# Patient Record
Sex: FEMALE | Race: WHITE | HISPANIC OR LATINO | Employment: FULL TIME | URBAN - METROPOLITAN AREA
[De-identification: names, ages, dates, MRNs, and addresses within clinical notes are randomized per-mention and may not be internally consistent; named-entity substitution may affect disease eponyms.]

---

## 2024-05-09 ENCOUNTER — HOSPITAL ENCOUNTER (EMERGENCY)
Facility: HOSPITAL | Age: 19
Discharge: HOME | End: 2024-05-10
Attending: EMERGENCY MEDICINE
Payer: COMMERCIAL

## 2024-05-09 ENCOUNTER — APPOINTMENT (OUTPATIENT)
Dept: GENERAL RADIOLOGY | Facility: HOSPITAL | Age: 19
End: 2024-05-09
Payer: COMMERCIAL

## 2024-05-09 DIAGNOSIS — R10.32 LEFT LOWER QUADRANT ABDOMINAL PAIN: ICD-10-CM

## 2024-05-09 DIAGNOSIS — K59.00 CONSTIPATION, UNSPECIFIED CONSTIPATION TYPE: Primary | ICD-10-CM

## 2024-05-09 LAB
B-HCG UR QL: NEGATIVE
BILIRUB UR QL STRIP: NEGATIVE
CLARITY UR: ABNORMAL
COLOR UR: ABNORMAL
GLUCOSE UR STRIP-MCNC: NEGATIVE MG/DL
HGB UR QL STRIP.AUTO: ABNORMAL
KETONES UR QL STRIP: NEGATIVE
LEUKOCYTE ESTERASE UR QL STRIP.AUTO: NEGATIVE
NITRITE UR QL STRIP: NEGATIVE
PH UR STRIP.AUTO: 6 [PH] (ref 5–8)
PROT UR QL STRIP: NEGATIVE
SP GR UR STRIP: 1.01 (ref 1–1.03)
UROBILINOGEN UR QL STRIP: ABNORMAL

## 2024-05-09 PROCEDURE — 81001 URINALYSIS AUTO W/SCOPE: CPT | Performed by: EMERGENCY MEDICINE

## 2024-05-09 PROCEDURE — 99284 EMERGENCY DEPT VISIT MOD MDM: CPT

## 2024-05-09 PROCEDURE — 81025 URINE PREGNANCY TEST: CPT | Performed by: EMERGENCY MEDICINE

## 2024-05-09 PROCEDURE — 74018 RADEX ABDOMEN 1 VIEW: CPT

## 2024-05-09 NOTE — Clinical Note
[facility] FSED BALAJI  [address]  [facility] [address]    Baron Wilkinson was seen and treated in our emergency department on 5/9/2024.  She may return to work on 05/11/2024.         Thank you for choosing [facility].    Kevin Clarke MD

## 2024-05-09 NOTE — Clinical Note
[facility]  [address]  [facility] [address]    Baron Wilkinson was seen and treated in our emergency department on 5/9/2024.  She may return to work on 05/11/2024.         Thank you for choosing [facility].    Kevin Clarke MD

## 2024-05-10 ENCOUNTER — APPOINTMENT (OUTPATIENT)
Dept: CT IMAGING | Facility: HOSPITAL | Age: 19
End: 2024-05-10
Payer: COMMERCIAL

## 2024-05-10 VITALS
TEMPERATURE: 98 F | WEIGHT: 103.4 LBS | OXYGEN SATURATION: 100 % | RESPIRATION RATE: 18 BRPM | HEIGHT: 65 IN | SYSTOLIC BLOOD PRESSURE: 118 MMHG | HEART RATE: 85 BPM | BODY MASS INDEX: 17.23 KG/M2 | DIASTOLIC BLOOD PRESSURE: 72 MMHG

## 2024-05-10 LAB
ALBUMIN SERPL-MCNC: 4.7 G/DL (ref 3.5–5.2)
ALBUMIN/GLOB SERPL: 1.9 G/DL
ALP SERPL-CCNC: 65 U/L (ref 39–117)
ALT SERPL W P-5'-P-CCNC: 11 U/L (ref 1–33)
ANION GAP SERPL CALCULATED.3IONS-SCNC: 10.6 MMOL/L (ref 5–15)
AST SERPL-CCNC: 12 U/L (ref 1–32)
BACTERIA UR QL AUTO: ABNORMAL /HPF
BASOPHILS # BLD AUTO: 0.04 10*3/MM3 (ref 0–0.2)
BASOPHILS NFR BLD AUTO: 0.5 % (ref 0–1.5)
BILIRUB SERPL-MCNC: 0.2 MG/DL (ref 0–1.2)
BUN SERPL-MCNC: 8 MG/DL (ref 6–20)
BUN/CREAT SERPL: 14 (ref 7–25)
CALCIUM SPEC-SCNC: 9.5 MG/DL (ref 8.6–10.5)
CHLORIDE SERPL-SCNC: 105 MMOL/L (ref 98–107)
CO2 SERPL-SCNC: 25.4 MMOL/L (ref 22–29)
CREAT SERPL-MCNC: 0.57 MG/DL (ref 0.57–1)
DEPRECATED RDW RBC AUTO: 39 FL (ref 37–54)
EGFRCR SERPLBLD CKD-EPI 2021: 133.6 ML/MIN/1.73
EOSINOPHIL # BLD AUTO: 0.27 10*3/MM3 (ref 0–0.4)
EOSINOPHIL NFR BLD AUTO: 3.6 % (ref 0.3–6.2)
ERYTHROCYTE [DISTWIDTH] IN BLOOD BY AUTOMATED COUNT: 12 % (ref 12.3–15.4)
FLUAV SUBTYP SPEC NAA+PROBE: NOT DETECTED
FLUBV RNA ISLT QL NAA+PROBE: NOT DETECTED
GLOBULIN UR ELPH-MCNC: 2.5 GM/DL
GLUCOSE SERPL-MCNC: 91 MG/DL (ref 65–99)
HCT VFR BLD AUTO: 41.3 % (ref 34–46.6)
HGB BLD-MCNC: 13.6 G/DL (ref 12–15.9)
HOLD SPECIMEN: NORMAL
HYALINE CASTS UR QL AUTO: ABNORMAL /LPF
IMM GRANULOCYTES # BLD AUTO: 0.01 10*3/MM3 (ref 0–0.05)
IMM GRANULOCYTES NFR BLD AUTO: 0.1 % (ref 0–0.5)
LYMPHOCYTES # BLD AUTO: 2.42 10*3/MM3 (ref 0.7–3.1)
LYMPHOCYTES NFR BLD AUTO: 32.2 % (ref 19.6–45.3)
MAGNESIUM SERPL-MCNC: 2.2 MG/DL (ref 1.7–2.2)
MCH RBC QN AUTO: 28.8 PG (ref 26.6–33)
MCHC RBC AUTO-ENTMCNC: 32.9 G/DL (ref 31.5–35.7)
MCV RBC AUTO: 87.5 FL (ref 79–97)
MONOCYTES # BLD AUTO: 0.57 10*3/MM3 (ref 0.1–0.9)
MONOCYTES NFR BLD AUTO: 7.6 % (ref 5–12)
NEUTROPHILS NFR BLD AUTO: 4.2 10*3/MM3 (ref 1.7–7)
NEUTROPHILS NFR BLD AUTO: 56 % (ref 42.7–76)
PLATELET # BLD AUTO: 245 10*3/MM3 (ref 140–450)
PMV BLD AUTO: 10.6 FL (ref 6–12)
POTASSIUM SERPL-SCNC: 3.8 MMOL/L (ref 3.5–5.2)
PROT SERPL-MCNC: 7.2 G/DL (ref 6–8.5)
RBC # BLD AUTO: 4.72 10*6/MM3 (ref 3.77–5.28)
RBC # UR STRIP: ABNORMAL /HPF
REF LAB TEST METHOD: ABNORMAL
SARS-COV-2 RNA RESP QL NAA+PROBE: NOT DETECTED
SODIUM SERPL-SCNC: 141 MMOL/L (ref 136–145)
SQUAMOUS #/AREA URNS HPF: ABNORMAL /HPF
STREP A PCR: NOT DETECTED
WBC # UR STRIP: ABNORMAL /HPF
WBC NRBC COR # BLD AUTO: 7.51 10*3/MM3 (ref 3.4–10.8)

## 2024-05-10 PROCEDURE — 25810000003 SODIUM CHLORIDE 0.9 % SOLUTION: Performed by: EMERGENCY MEDICINE

## 2024-05-10 PROCEDURE — 87636 SARSCOV2 & INF A&B AMP PRB: CPT | Performed by: EMERGENCY MEDICINE

## 2024-05-10 PROCEDURE — 74176 CT ABD & PELVIS W/O CONTRAST: CPT

## 2024-05-10 PROCEDURE — 87651 STREP A DNA AMP PROBE: CPT | Performed by: EMERGENCY MEDICINE

## 2024-05-10 PROCEDURE — 85025 COMPLETE CBC W/AUTO DIFF WBC: CPT | Performed by: EMERGENCY MEDICINE

## 2024-05-10 PROCEDURE — 96360 HYDRATION IV INFUSION INIT: CPT

## 2024-05-10 PROCEDURE — 80053 COMPREHEN METABOLIC PANEL: CPT | Performed by: EMERGENCY MEDICINE

## 2024-05-10 PROCEDURE — 83735 ASSAY OF MAGNESIUM: CPT | Performed by: EMERGENCY MEDICINE

## 2024-05-10 PROCEDURE — 99284 EMERGENCY DEPT VISIT MOD MDM: CPT | Performed by: EMERGENCY MEDICINE

## 2024-05-10 PROCEDURE — 36415 COLL VENOUS BLD VENIPUNCTURE: CPT

## 2024-05-10 RX ORDER — BISACODYL 10 MG
SUPPOSITORY, RECTAL RECTAL
Qty: 14 EACH | Refills: 0 | Status: SHIPPED | OUTPATIENT
Start: 2024-05-10

## 2024-05-10 RX ORDER — POLYETHYLENE GLYCOL 3350 17 G/17G
17 POWDER, FOR SOLUTION ORAL DAILY
Qty: 72 PACKET | Refills: 0 | Status: SHIPPED | OUTPATIENT
Start: 2024-05-10 | End: 2024-07-21

## 2024-05-10 RX ADMIN — SODIUM CHLORIDE 500 ML: 9 INJECTION, SOLUTION INTRAVENOUS at 01:04

## 2024-05-10 NOTE — DISCHARGE INSTRUCTIONS
????? ??????? ?????????? ??? ?? ????? ????? ??? ? ????????? ?? ??????? ??? ???.  ?? ???? ????? ?? ??? ?????? ?? ????? ?????? ?????? ?? ??????? ??? ????.    ?? ???? CAT? ????? ???? ????? ?????.  ?? ?? ??? ???? ?????? ?? ?????? ???? ???? ??? ????.    amroz arzyabi izmayishgahi jordyn ba shomaresh tabiee khoon wa alkatruliyat [facility] hamin tarteeb ma heech virgilio az afvant dastgah idraari ra shenasaaleks [place].    ramos skin CAT? yaboost qabil tawjahi darid. man ramos andrew hazar modei az insadad rode aline oconnoram.      476/500   New Translations Available     Bengali  ????? ??? ????? ??? ?? ???? ????? ??? ?? ??? ????.  ?? ???? ??????? ???? ??? ?? ??? ?? ??? ???? ???? ??? ????? ?????? ???? ??? ??? ?? ??? ??? ?? ?? ?????.  ?????? ???? ??????? ???? ???? ?? ?? ??? ??? ????? ????? ?? ?? ?????.    ?? ???? ?? ???? ??? ?????? ?? ?? ????? ??? ?? ?? ?????? ???? ???? ????? ??? ??.    ?? ???? ????? ???????.  1 ?? ????? ?????? ?? ??? MiraLAX ???.  ??? ????? ?? ??? ????? ?? ??? ? ????? ?? ??? ???.  ??? ?????? ?? ???? ???? ?? ???? ?????? ???.  basyar tirso khahd bood kah regime ghazayi khud ra hifz kenid. [place] atminaan hasil kand kah jordyn [place] mariel munSantiam Hospitalda scarlett ghazayi bernama rezi shadeh hati ramos kris jeffry ra may khorid. hemchanin bayad atminaan hasil kenid kah ramos tool bj maqdar ziyadi ab may noshid.    [facility] wada scarlett ghazayein man may khawahim jordyn ra bay afzayish fibre regime ghazayi khud ra.    du yeni herradem. 1 yak [facility] nam MiraLAX st. ain darman khat awal mahsoob may shood wa basyar bee khatar [facility]. [facility] y[facility] daro ra masraf khawahid kord.    ????? ??? ???? ?? ??? Dulcolax ???.  ??? ?? ?????? ?? ??? ????? ?? 12 ???? ?? ???? ???? ?? ???? ???? ???? ???? ??????? ????.  [facility] nam Dulcolax [facility]. [facility] may tawanid az ain rectum her 12 saat ramos soorat niyaz ta michelle harkat rode buzrag stefade kenid.    ?? ?? ????? ??? ?? ?? ?????? ??  ?????? ??? ????? ??? ?? ?? ???? 7 ?? 10 ??? ?? ????? ???? ?? ??? ????? ????? ?? ?? ???? ???? ??? ???.    ?? ???????? ?? ??? ?? ?? ?? ???? ???? ???? ??? ????? ? ????? ?? ?? ????? ?? ?????  man may khawahim jordyn ra bay pigeri ba maraqbat scarlett oliyah khud ra ramos hadud 7 ta 10 bj bay matmen schwid kah jordyn ahsas behbood pas az daro aghaaz shadeh st.    ma khoshhalim kah jordyn ra ramos her michelle brai badtar shadan alayam wa nishana hah ramos inja may benium            Today your laboratory evaluation is reassuring with normal blood count and electrolytes.  Likewise we do not identify any evidence of a urinary tract infection.    On your CAT scan, you do have significant constipation.  I do not see evidence of a small bowel obstruction at this time.    It will be very important going forward to maintain your diet.  He needs to make sure that you are eating regularly scheduled meals even at work.  You also need to make sure that you are drinking plenty of water during the daytime.    Regarding meals, I would like you to increase your dietary fiber.    I did send him 2 prescriptions.  1 is a daily medication called MiraLAX.  It is considered first-line therapy and is very safe.  You will take 1 packet of the medication mixed in any fluid that once daily.    A second medicine is a suppository called Dulcolax.  You may use this per rectum every 12 hours as needed until large bowel movement.    I would like you to follow-up with your primary care in approximately 7 to 10 days to make sure that you are feeling improved after the medication has been started.    We are happy to see you here at any time for worsening signs or symptoms

## 2024-05-10 NOTE — FSED PROVIDER NOTE
Subjective   History of Present Illness  Patient is a 18-year-old female.  She presents with a 1 to 2-day history of left lower quadrant abdominal pain.  Some nausea no vomiting.  No associated diarrhea.  Patient does have a history of chronic constipation which has been a problem.  She also is status post appendectomy in the past.    No dysuria.  She did start her menstrual cycle today.  Additionally she does complain of a intermittent sore throat such as an upper respiratory infection.  She does complain of some anterior neck discomfort at times.  No sick contacts at home.  No treatment prior to arrival      Review of Systems    History reviewed. No pertinent past medical history.    No Known Allergies    Past Surgical History:   Procedure Laterality Date    APPENDECTOMY         History reviewed. No pertinent family history.    Social History     Socioeconomic History    Marital status: Single           Objective   Physical Exam  Vital signs: Reviewed in nurses notes    General: Awake alert no acute distress    HEENT: Normocephalic atraumatic nasopharynx clear pharynx clear moist    Neck:   Supple without lymphadenopathy    Respiratory:   Nonlabored respirations.  Clear to auscultation bilaterally.  Equal breath sounds bilaterally.  No wheezes or stridor noted.    Cardiovascular: Regular rate and rhythm.  No murmur.  Equal pulses in bilateral lower extremities without edema.    Abdomen: Flat nondistended.  Bowel sounds are present.  There is mild left lower quadrant tenderness to deep palpation without guarding or rebound    Skin:   Warm and dry.  No rashes noted    Neurological examination: Patient is awake alert oriented x4.  Speech is normal.  No facial palsy.  No focal motor or sensory deficits.  Procedures             Lab Results (last 72 hours)       Procedure Component Value Units Date/Time    Urinalysis With Culture If Indicated - Urine, Clean Catch [[id number]]  (Abnormal) Collected: 05/09/24 6458     Specimen: Urine, Clean Catch Updated: 05/09/24 2228     Color, UA Dark Yellow     Appearance, UA Cloudy     pH, UA 6.0     Specific Gravity, UA 1.015     Glucose, UA Negative     Ketones, UA Negative     Bilirubin, UA Negative     Blood, UA Large (3+)     Protein, UA Negative     Leuk Esterase, UA Negative     Nitrite, UA Negative     Urobilinogen, UA 0.2 E.U./dL    Narrative:      In absence of clinical symptoms, the presence of pyuria, bacteria, and/or nitrites on the urinalysis result does not correlate with infection.    Pregnancy, Urine - Urine, Clean Catch [[id number]]  (Normal) Collected: 05/09/24 2225    Specimen: Urine, Clean Catch Updated: 05/09/24 2233     HCG, Urine QL Negative    Urinalysis, Microscopic Only - Urine, Clean Catch [[id number]]  (Abnormal) Collected: 05/09/24 2225    Specimen: Urine, Clean Catch Updated: 05/10/24 0158     RBC, UA Too Numerous to Count /HPF      WBC, UA 0-2 /HPF      Comment: Urine culture not indicated.        Bacteria, UA None Seen /HPF      Squamous Epithelial Cells, UA 0-2 /HPF      Hyaline Casts, UA None Seen /LPF      Methodology Automated Microscopy    [facility] Urine Culture Tube - Urine, Clean Catch [[id number]] Collected: 05/09/24 2225    Specimen: Urine, Clean Catch Updated: 05/10/24 0141     Extra Tube Hold for add-ons.     Comment: Auto resulted.       CBC & Differential [[id number]]  (Abnormal) Collected: 05/10/24 0039    Specimen: Blood Updated: 05/10/24 0043    Narrative:      The following orders were created for panel order CBC & Differential.  Procedure                               Abnormality         Status                     ---------                               -----------         ------                     CBC Auto Differential[[id number]]        Abnormal            Final result                 Please view results for these tests on the individual orders.    Comprehensive Metabolic Panel [[id number]] Collected: 05/10/24 0039    Specimen: Blood Updated:  05/10/24 0101     Glucose 91 mg/dL      BUN 8 mg/dL      Creatinine 0.57 mg/dL      Sodium 141 mmol/L      Potassium 3.8 mmol/L      Chloride 105 mmol/L      CO2 25.4 mmol/L      Calcium 9.5 mg/dL      Total Protein 7.2 g/dL      Albumin 4.7 g/dL      ALT (SGPT) 11 U/L      AST (SGOT) 12 U/L      Alkaline Phosphatase 65 U/L      Total Bilirubin 0.2 mg/dL      Globulin 2.5 gm/dL      A/G Ratio 1.9 g/dL      BUN/Creatinine Ratio 14.0     Anion Gap 10.6 mmol/L      eGFR 133.6 mL/min/1.73     Narrative:      GFR Normal >60  Chronic Kidney Disease <60  Kidney Failure <15      Magnesium [[id number]]  (Normal) Collected: 05/10/24 0039    Specimen: Blood Updated: 05/10/24 0101     Magnesium 2.2 mg/dL     Rapid Strep A Screen - Swab, Throat [[id number]]  (Normal) Collected: 05/10/24 0039    Specimen: Swab from Throat Updated: 05/10/24 0057     STREP A PCR Not Detected    COVID-19 and FLU A/B PCR, 1 HR TAT - Swab, Nasopharynx [[id number]]  (Normal) Collected: 05/10/24 0039    Specimen: Swab from Nasopharynx Updated: 05/10/24 0101     COVID19 Not Detected     Influenza A PCR Not Detected     Influenza B PCR Not Detected    Narrative:      Fact sheet for providers: https://www.fda.gov/media/929954/download    Fact sheet for patients: https://www.fda.gov/media/749067/download    Test performed by PCR.    CBC Auto Differential [[id number]]  (Abnormal) Collected: 05/10/24 0039    Specimen: Blood Updated: 05/10/24 0043     WBC 7.51 10*3/mm3      RBC 4.72 10*6/mm3      Hemoglobin 13.6 g/dL      Hematocrit 41.3 %      MCV 87.5 fL      MCH 28.8 pg      MCHC 32.9 g/dL      RDW 12.0 %      RDW-SD 39.0 fl      MPV 10.6 fL      Platelets 245 10*3/mm3      Neutrophil % 56.0 %      Lymphocyte % 32.2 %      Monocyte % 7.6 %      Eosinophil % 3.6 %      Basophil % 0.5 %      Immature Grans % 0.1 %      Neutrophils, Absolute 4.20 10*3/mm3      Lymphocytes, Absolute 2.42 10*3/mm3      Monocytes, Absolute 0.57 10*3/mm3      Eosinophils, Absolute  0.27 10*3/mm3      Basophils, Absolute 0.04 10*3/mm3      Immature Grans, Absolute 0.01 10*3/mm3              ED Course                               Medications   sodium chloride 0.9 % bolus 500 mL (0 mL Intravenous Stopped 5/10/24 0137)      CT Abdomen Pelvis Stone Protocol    Result Date: 5/10/2024  Patient: TAMIKO MAHONEY  Time Out: 01:42 Exam(s): CT ABDOMEN + PELVIS EXAM:   CT Abdomen and Pelvis Without Intravenous Contrast CLINICAL HISTORY:    Reason for exam: llq abd pain, hematuria. TECHNIQUE:   Axial computed tomography images of the abdomen and pelvis without intravenous contrast.  CTDI is 4.72 mGy and DLP is 224.7 mGy-cm.  This CT exam was performed according to the principle of ALARA (As Low As Reasonably Achievable) by using one or more of the following dose reduction techniques: automated exposure control, adjustment of the mA and or kV according to patient size, and or use of iterative reconstruction technique. COMPARISON: KUB 05-. FINDINGS:   Lung bases:  Unremarkable.  No mass.  No consolidation.  ABDOMEN:   Liver:  Unremarkable.   Gallbladder and bile ducts:  Unremarkable.  No calcified stones.  No ductal dilation.   Pancreas:  Unremarkable.  No ductal dilation.   Spleen:  Unremarkable.  No splenomegaly.   Adrenals:  Unremarkable.  No mass.   Kidneys and ureters:  No obstructive uropathy.  No obstructing renal or ureteral calculi.  No hydronephrosis or hydroureter.   Stomach and bowel:  No obstruction or ileus.  No evidence for diverticulitis.  PELVIS:   Appendix:  No findings to suggest acute appendicitis.   Bladder: Partially contracted with moderate diffuse wall thickening.  No stones.   Reproductive:  Uterus and ovaries are grossly unremarkable.  ABDOMEN and PELVIS:   Intraperitoneal space:  No free air.  Small amount of lower pelvic free fluid.   Bones joints:  No acute fracture.   Soft tissues:  Unremarkable.   Vasculature:  Unremarkable.  No abdominal aortic aneurysm.   Lymph nodes:   Unremarkable.  No enlarged lymph nodes. IMPRESSION:     No obstructive uropathy.  No renal or ureteral calculus. Partially contracted urinary bladder with nonspecific wall thickening.  Cannot exclude cystitis. Abundant stool throughout the colon. Grossly unremarkable uterus and ovaries.  Small amount of lower pelvic free fluid.  Cannot exclude a ruptured ovarian cyst.    Electronically signed by Juwan Valdez M.D. on 05-10-24 at 0142    XR Abdomen KUB    Result Date: 5/9/2024  Patient: MARU MORRISON  Time Out: 23:43 Exam(s): XR ABDOMEN EXAM:   XR Abdomen, 2 Views CLINICAL HISTORY:    Reason for exam: constipation, last BM 2 days ago.. TECHNIQUE:   Frontal view of the abdomen pelvis with upright view of the abdomen. COMPARISON:   No relevant prior studies available. FINDINGS:   Intraperitoneal space:  No free air.   Gastrointestinal tract:  Mild-moderate colorectal stool burden.  No specific findings of bowel obstruction.   Bones joints:  Unremarkable.  No acute fracture. IMPRESSION:     Mild to moderate colonic stool burden.  No evidence of bowel obstruction.     Electronically signed by Addison Monson M.D. on 05-09-24 at 2343              Medical Decision Making  Problems Addressed:  Constipation, unspecified constipation type: complicated acute illness or injury  Left lower quadrant abdominal pain: complicated acute illness or injury    Amount and/or Complexity of Data Reviewed  Labs: ordered.  Radiology: ordered.    Risk  OTC drugs.    The CAT scan was independently reviewed in addition to the review of the radiologist interpretation    Final diagnoses:   Constipation, unspecified constipation type   Left lower quadrant abdominal pain       ED Disposition  ED Disposition       ED Disposition   Discharge    Condition   Stable    Comment   --               Faye Garay, APRN  [address] [facility] [address] [phone]    In 1 week           Medication List        New Prescriptions       bisacodyl 10 MG suppository  Commonly known as: Dulcolax  1 supp pr q12 prn severe constipation     polyethylene glycol 17 g packet  Commonly known as: MIRALAX  Take 17 g by mouth Daily for 72 doses.               Where to Get Your Medications        These medications were sent to [facility]/pharmacy #[id number] - [place], [place] - [address] [place] [address] RD. AT [facility] - [phone] [facility] [phone]   [address] [place] [address] [address] [facility] [address]      Phone: [phone]   bisacodyl 10 MG suppository  polyethylene glycol 17 g packet